# Patient Record
Sex: FEMALE | Race: OTHER
[De-identification: names, ages, dates, MRNs, and addresses within clinical notes are randomized per-mention and may not be internally consistent; named-entity substitution may affect disease eponyms.]

---

## 2018-12-02 ENCOUNTER — HOSPITAL ENCOUNTER (EMERGENCY)
Dept: HOSPITAL 72 - EMR | Age: 18
Discharge: HOME | End: 2018-12-02
Payer: SELF-PAY

## 2018-12-02 VITALS — DIASTOLIC BLOOD PRESSURE: 60 MMHG | SYSTOLIC BLOOD PRESSURE: 100 MMHG

## 2018-12-02 VITALS — BODY MASS INDEX: 20.4 KG/M2 | HEIGHT: 67 IN | WEIGHT: 130 LBS

## 2018-12-02 VITALS — DIASTOLIC BLOOD PRESSURE: 56 MMHG | SYSTOLIC BLOOD PRESSURE: 106 MMHG

## 2018-12-02 VITALS — SYSTOLIC BLOOD PRESSURE: 98 MMHG | DIASTOLIC BLOOD PRESSURE: 58 MMHG

## 2018-12-02 DIAGNOSIS — F10.129: Primary | ICD-10-CM

## 2018-12-02 PROCEDURE — 99283 EMERGENCY DEPT VISIT LOW MDM: CPT

## 2018-12-02 NOTE — EMERGENCY ROOM REPORT
History of Present Illness


General


Chief Complaint:  Altered Level of Consciousness


Source:  EMS





Present Illness


HPI


Pt. is here because she has been drinking. Found at wrong person's home. Denies 

specific trauma, no fall, no headache, no neck pain, no cp, no abd pain, no sob

, no extremity pain. 





No report of trauma. The patient denies specific complaint.





The remaining HPI is limited due to the patient's alcohol intoxication.


Allergies:  


Coded Allergies:  


     UNABLE TO ASSESS (Unverified , 12/2/18)


 pt is nonverbal at time of arrival





Patient History


Last Menstrual Period:  N/A





Nursing Documentation-PMH


Past Medical History:  No Stated History





Review of Systems


All Other Systems:  limited





Physical Exam





Vital Signs








  Date Time  Temp Pulse Resp B/P (MAP) Pulse Ox O2 Delivery O2 Flow Rate FiO2


 


12/2/18 03:30 97.3 80 18 114/71 100 Room Air  








General Appearance:  well appearing, no apparent distress, other - intoxicated


Head:  normocephalic, atraumatic


Eyes:  bilateral eye normal inspection, bilateral eye PERRL


ENT:  uvula midline, moist mucus membranes


Neck:  full range of motion, supple


Respiratory:  no respiratory distress


Cardiovascular #1:  regular rate, rhythm


Gastrointestinal:  normal bowel sounds, non tender


Musculoskeletal:  normal inspection


Neurologic:  DTRs symmetric, other - intoxicated; nonfocal MAEx4 spont


Psychiatric:  other - cannot evaluate


Skin:  no rash





Medical Decision Making


Diagnostic Impression:  


 Primary Impression:  


 Alcohol intoxication





Rhythm Strip Diag. Results


Rhythm Strip Time:  03:36


EP Interpretation:  yes


Rate:  90


Rhythm:  NSR





Last Vital Signs








  Date Time  Temp Pulse Resp B/P (MAP) Pulse Ox O2 Delivery O2 Flow Rate FiO2


 


12/2/18 03:30 97.3 80 18 114/71 100 Room Air  








Status:  improved


Disposition:  HOME, SELF-CARE


Condition:  Stable


Scripts


Unable to Obtain Active Prescriptions or Reported Meds


Patient Instructions:  Alcohol Intoxication, Easy-to-Read











Kevin Roberto M.D. Dec 2, 2018 03:37